# Patient Record
Sex: MALE | Race: WHITE
[De-identification: names, ages, dates, MRNs, and addresses within clinical notes are randomized per-mention and may not be internally consistent; named-entity substitution may affect disease eponyms.]

---

## 2019-01-31 ENCOUNTER — HOSPITAL ENCOUNTER (INPATIENT)
Dept: HOSPITAL 74 - YASAS | Age: 34
LOS: 4 days | Discharge: HOME | DRG: 773 | End: 2019-02-04
Attending: NEUROMUSCULOSKELETAL MEDICINE & OMM | Admitting: NEUROMUSCULOSKELETAL MEDICINE & OMM
Payer: COMMERCIAL

## 2019-01-31 VITALS — BODY MASS INDEX: 31.7 KG/M2

## 2019-01-31 DIAGNOSIS — F10.230: ICD-10-CM

## 2019-01-31 DIAGNOSIS — F19.24: ICD-10-CM

## 2019-01-31 DIAGNOSIS — F17.213: ICD-10-CM

## 2019-01-31 DIAGNOSIS — F32.9: ICD-10-CM

## 2019-01-31 DIAGNOSIS — F11.23: Primary | ICD-10-CM

## 2019-01-31 PROCEDURE — HZ2ZZZZ DETOXIFICATION SERVICES FOR SUBSTANCE ABUSE TREATMENT: ICD-10-PCS | Performed by: NEUROMUSCULOSKELETAL MEDICINE & OMM

## 2019-01-31 RX ADMIN — Medication SCH MG: at 21:53

## 2019-01-31 NOTE — HP
COWS





- Scale


Resting Pulse: 1= [place] 


Sweatin= Chills/Flushing


Restless Observation: 0= Sits Still


Pupil Size: 0= Normal to Room Light


Bone or Joint Aches: 4=Acute Joint/Muscle Pain


Runny Nose/ Eye Tearin= None


GI Upset > 30mins: 2= Nausea/Diarrhea


Tremor Observation: 0= None


Yawning Observation: 0= None


Anxiety or Irritability: 1=Feels Anxious/Irritable


Goose Flesh Skin: 0=Smooth Skin


COWS Score: 9





CIWA Score


Nausea/Vomitin-Mild Nausea/No Vomiting


Muscle Tremors: None


Anxiety: 1-Mildly Anxious


Agitation: 0-Normal Activity


Paroxysmal Sweats: 3


Orientation: 0-Oriented


Tacttile Disturbances: 2-Mild Itch/Numbness/Burn


Auditory Disturbances: 0-None


Visual Disturbances: 2-Mild Sensitivity


Headache: 3-Moderate


CIWA-Ar Total Score: 12





- Admission Criteria


[facility] Guidelines: Admission for Medically Managed Detox: 


Requires at least one of the followin. CIWA greater than 12


2. Seizures within the past 24 hours


3. Delirium tremens within the past 24 hours


4. Hallucinations within the past 24 hours


5. Acute intervention needed for co  occurring medical disorder


6. Acute intervention needed for co  occurring psychiatric disorder


7. Severe withdrawal that cannot be handled at a lower level of care (continued


    vomiting, continued diarrhea, abnormal vital signs) requiring intravenous


    medication and/or fluids


8. Pregnancy





Patient presents the following: CIWA greater than 12


Admission Criteria Met: Admission criteria met





Admission ROS [facility]


Chief Complaint: 





C/ WORSENING WITHDRAWAL SX'S


SEEKING DETOX 


Allergies/Adverse Reactions: 


 Allergies











Allergy/AdvReac Type Severity Reaction Status Date / Time


 


No Known Allergies Allergy   Verified 19 18:18











History of Present Illness: 





33 Y.. MALE WITH HX/O POLYSUBSTANCE ABUSE HERE FOR HEROIN/ALCOHOL DETOX. THIS 

IS CLIENTS FIRST ADMISSION HERE. REFERRED BY CHELI NOLEN AFTER HE PRESENTED 

THERE FOR DETOX AND THERE WERE NO AVAILABLE BEDS. PRESENTS TODAY WITH WORSENING 

WITHDRAWAL SX'S COWS 9/ CIWA 12. REPORTS LONGEST CLEAN TIME 1 YEAR. RELAPSING 1 

MONTH AGO. PMHX- DENIES MEDICAL BU REPORTS HX/O DEPRESSION. DENIES SI/HI, AVH. 

CURRENTLY LIVES WITH FAMILY, EMPLOYED, DENIES LEGALS.


Exam Limitations: No Limitations





- Ebola screening


Have you traveled outside of the country in the last 21 days: No


Have you had contact with anyone from an Ebola affected area: No


Have you been sick,other than usual withdrawal symptoms: No


Do you have a fever: No





- Review of Systems


Constitutional: Chills, Loss of Appetite, Malaise, Night Sweats, Changes in 

sleep


EENT: reports: No Symptoms Reported


Respiratory: reports: No Symptoms reported


Cardiac: reports: No Symptoms Reported


GI: reports: Diarrhea, Nausea, Poor Appetite


: reports: No Symptoms Reported


Musculoskeletal: reports: No Symptoms Reported


Integumentary: reports: No Symptoms Reported


Neuro: reports: Headache, Weakness (GENERALIZED)


Endocrine: reports: No Symptoms Reported


Hematology: reports: No Symptoms Reported


Psychiatric: reports: Orientated x3, Depressed


Other Systems: Reviewed and Negative





Patient History





- Patient Medical History


Hx Anemia: No


Hx Asthma: No


Hx Chronic Obstructive Pulmonary Disease (COPD): No


Hx Cancer: No


Hx Cardiac Disorders: No


Hx Congestive Heart Failure: No


Hx Hypertension: No


Hx Hypercholesterolemia: No


Hx Pacemaker: No


HX Cerebrovascular Accident: No


Hx Seizures: No


Hx Diabetes: No


Hx Gastrointestinal Disorders: No


Hx Liver Disease: No


Hx Genitourinary Disorders: No


Hx Sexually Transmitted Disorders: No


Hx Renal Disease (ESRD): No


Hx Thyroid Disease: No


Hx Human Immunodeficiency Virus (HIV): No


Hx Hepatitis C: No


Hx Depression: Yes


Hx Suicide Attempt: No


Hx Bipolar Disorder: No


Hx Schizophrenia: No





- Patient Surgical History


Past Surgical History: No


Hx Neurologic Surgery: No


Hx Cataract Extraction: No


Hx Cardiac Surgery: No


Hx Lung Surgery: No


Hx Breast Surgery: No


Hx Breast Biopsy: No


Hx Abdominal Surgery: No


Hx Appendectomy: No


Hx Cholecystectomy: No


Hx Genitourinary Surgery: No


Hx  Section: No


Hx Orthopedic Surgery: No


Anesthesia Reaction: No





- PPD History


Previous Implant?: Yes


Documented Results: Negative w/o proof


Implanted On Prior [facility] Admission?: No


PPD to be Administered?: Yes





- Smoking Cessation


Smoking history: Current every day smoker


Have you smoked in the past 12 months: Yes


Aproximately how many cigarettes per day: 5


Cigars Per Day: 0


Hx Chewing Tobacco Use: No


Initiated information on smoking cessation: Yes


'Breaking Loose' booklet given: 19





- Substance & Tx. History


Hx Alcohol Use: Yes


Hx Substance Use: Yes


Substance Use Type: Alcohol, Heroin, Marijuana


Hx Substance Use Treatment: Yes ([facility])





- Substances Abused


  ** Alcohol


Route: Oral


Frequency: Daily


Amount used: LIQUOR- 1 PINT. BEER- 5 (40oz)


Age of first use: 18


Date of Last Use: 19





  ** Heroin


Route: Inhalation


Frequency: Daily


Amount used: 20bags


Age of first use: 30


Date of Last Use: 19





Family Disease History





- Family Disease History


Family History: Denies





Admission Physical Exam [facility]





- Vital Signs


Vital Signs: 


 Vital Signs - 24 hr











  19





  16:49


 


Temperature 97 F L


 


Pulse Rate 89


 


Respiratory 18





Rate 


 


Blood Pressure 105/77














- Physical


General Appearance: Yes: Appropriately Dressed, Tremorous (SLIGHT)


HEENTM: Yes: EOMI, Normal ENT Inspection, Normocephalic, Normal Voice, SPENCER, 

Pharynx Normal


Respiratory: Yes: Chest Non-Tender, Lungs Clear, Normal Breath Sounds, No 

Respiratory Distress, No Accessory Muscle Use


Neck: Yes: No masses,lesions,Nodules, Supple, Trachea in good position


Breast: Yes: Breast Exam Deferred


Cardiology: Yes: Regular Rhythm, Regular Rate, S1, S2


Abdominal: Yes: Normal Bowel Sounds, Non Tender


Genitourinary: Yes: Other (NO C/O)


Back: Yes: Normal Inspection


Musculoskeletal: Yes: full range of Motion, Gait Steady


Extremities: Yes: Normal Capillary Refill, Normal Range of Motion, Non-Tender, 

Tremors (SLIGHT)


Neurological: Yes: CNs II-XII NML intact, Fully Oriented, Alert, Motor Strength 

5/5, Depressed Affect


Integumentary: Yes: Dry, Warm


Lymphatic: Yes: Within Normal Limits





- Diagnostic


(1) Polysubstance abuse


Current Visit: Yes   Status: Acute   





(2) Alcohol dependence with uncomplicated withdrawal


Current Visit: Yes   Status: Acute   





(3) Opioid dependence with withdrawal


Current Visit: Yes   Status: Acute   





(4) Nicotine dependence


Current Visit: Yes   Status: Chronic   


Qualifiers: 


   Nicotine product type: cigarettes   Substance use status: uncomplicated   

Qualified Code(s): F17.210 - Nicotine dependence, cigarettes, uncomplicated   





(5) Depressed affect


Current Visit: Yes   Status: Acute   





(6) Substance induced mood disorder


Current Visit: Yes   Status: Suspected   





(7) Cannabis dependence, uncomplicated


Current Visit: Yes   Status: Acute   





Cleared for Admission [facility]





- Detox or Rehab


[facility] Level of Care: Medically Managed


Detox Regimen/Protocol: Methadone/Librium


Claeared for Rehab Admission: No





[facility] Breath Alcohol Content


Breath Alcohol Content: 0





Urine Drug Screen





- Results


Drug Screen Negative: No


Urine Drug Screen Results: THC-Marijuana, OPI-Opiates, BAR-Barbiturates, MTD-

Methadone, FEN-Fentanyl

## 2019-02-01 LAB
ALBUMIN SERPL-MCNC: 4.2 G/DL (ref 3.4–5)
ALP SERPL-CCNC: 98 U/L (ref 45–117)
ALT SERPL-CCNC: 52 U/L (ref 13–61)
ANION GAP SERPL CALC-SCNC: 8 MMOL/L (ref 8–16)
APPEARANCE UR: (no result)
AST SERPL-CCNC: 26 U/L (ref 15–37)
BILIRUB SERPL-MCNC: 0.5 MG/DL (ref 0.2–1)
BILIRUB UR STRIP.AUTO-MCNC: NEGATIVE MG/DL
BUN SERPL-MCNC: 17 MG/DL (ref 7–18)
CALCIUM SERPL-MCNC: 9.7 MG/DL (ref 8.5–10.1)
CHLORIDE SERPL-SCNC: 104 MMOL/L (ref 98–107)
CO2 SERPL-SCNC: 28 MMOL/L (ref 21–32)
COLOR UR: (no result)
CREAT SERPL-MCNC: 0.9 MG/DL (ref 0.55–1.3)
DEPRECATED RDW RBC AUTO: 14 % (ref 11.9–15.9)
EPITH CASTS URNS QL MICRO: (no result) /HPF
GLUCOSE SERPL-MCNC: 99 MG/DL (ref 74–106)
HCT VFR BLD CALC: 49.5 % (ref 35.4–49)
HGB BLD-MCNC: 16.6 GM/DL (ref 11.7–16.9)
KETONES UR QL STRIP: NEGATIVE
LEUKOCYTE ESTERASE UR QL STRIP.AUTO: NEGATIVE
MCH RBC QN AUTO: 30.9 PG (ref 25.7–33.7)
MCHC RBC AUTO-ENTMCNC: 33.6 G/DL (ref 32–35.9)
MCV RBC: 91.9 FL (ref 80–96)
MUCOUS THREADS URNS QL MICRO: (no result)
NITRITE UR QL STRIP: NEGATIVE
PH UR: 5 [PH] (ref 5–8)
PLATELET # BLD AUTO: 289 K/MM3 (ref 134–434)
PMV BLD: 9.6 FL (ref 7.5–11.1)
POTASSIUM SERPLBLD-SCNC: 4.6 MMOL/L (ref 3.5–5.1)
PROT SERPL-MCNC: 7.3 G/DL (ref 6.4–8.2)
PROT UR QL STRIP: (no result)
PROT UR QL STRIP: NEGATIVE
RBC # BLD AUTO: 5.38 M/MM3 (ref 4–5.6)
SODIUM SERPL-SCNC: 139 MMOL/L (ref 136–145)
SP GR UR: 1.03 (ref 1.01–1.03)
UROBILINOGEN UR STRIP-MCNC: NEGATIVE MG/DL (ref 0.2–1)
WBC # BLD AUTO: 8.8 K/MM3 (ref 4–10)

## 2019-02-01 RX ADMIN — Medication SCH MG: at 22:51

## 2019-02-01 RX ADMIN — Medication SCH TAB: at 10:26

## 2019-02-01 RX ADMIN — NICOTINE SCH: 14 PATCH, EXTENDED RELEASE TRANSDERMAL at 10:28

## 2019-02-01 NOTE — EKG
Test Reason : 

Blood Pressure : ***/*** mmHG

Vent. Rate : 082 BPM     Atrial Rate : 082 BPM

   P-R Int : 130 ms          QRS Dur : 076 ms

    QT Int : 360 ms       P-R-T Axes : -18 020 031 degrees

   QTc Int : 420 ms

 

NORMAL SINUS RHYTHM

NORMAL ECG

NO PREVIOUS ECGS AVAILABLE

Confirmed by VIK INGRAM, KEREN ([id number]) on 2/1/2019 11:49:25 AM

 

Referred By:             Confirmed By:KEREN CHERY MD

## 2019-02-01 NOTE — PN
S CIWA





- CIWA Score


Nausea/Vomitin-No Nausea/No Vomiting


Muscle Tremors: 3


Anxiety: 2


Agitation: 0-Normal Activity


Paroxysmal Sweats: 3


Orientation: 4Disoriented Place/Person


Tacttile Disturbances: 2-Mild Itch/Numbness/Burn


Auditory Disturbances: 0-None


Visual Disturbances: 2-Mild Sensitivity


Headache: 0-None Present


CIWA-Ar Total Score: 16





[facility] COWS





- Scale


Resting Pulse: 0= [place] 80 or Below


Sweatin= Chills/Flushing


Restless Observation: 1= Difficult to Sit Still


Pupil Size: 0= Normal to Room Light


Bone or Joint Aches: 2= Severe Diffuse Aches


Runny Nose/ Eye Tearin= None


GI Upset > 30mins: 0= None


Tremor Observation of Outstretched Hands: 2= Slight Tremor Visible


Yawning Observation: 1= 1-2x During Session


Anxiety or Irritability: 2=Irritable/Anxious


Goose Flesh Skin: 3=[place]


COWS Score: 12





BHS Progress Note (SOAP)


Subjective: 





Body Aches, Tremors, Sweating.


Objective: 


PATIENT A & O X 2 (UNCERTAIN ABOUT CURRENT LOCATION). PATIENT OBSERVED 

AMBULATING ON UNIT. IN NO ACUTE DISTRESS.





19 17:57


 Vital Signs











Temperature  98 F   19 17:10


 


Pulse Rate  80   19 17:10


 


Respiratory Rate  16   19 17:10


 


Blood Pressure  102/63   19 17:10


 


O2 Sat by Pulse Oximetry (%)      








 Laboratory Tests











  19





  00:30 07:00 07:00


 


WBC   8.8 


 


RBC   5.38 


 


Hgb   16.6 


 


Hct   49.5 H 


 


MCV   91.9 


 


MCH   30.9 


 


MCHC   33.6 


 


RDW   14.0 


 


Plt Count   289 


 


MPV   9.6 


 


Sodium    139


 


Potassium    4.6


 


Chloride    104


 


Carbon Dioxide    28


 


Anion Gap    8


 


BUN    17


 


Creatinine    0.9


 


Creat Clearance w eGFR    > 60


 


Random Glucose    99


 


Calcium    9.7


 


Total Bilirubin    0.5


 


AST    26


 


ALT    52


 


Alkaline Phosphatase    98


 


Total Protein    7.3


 


Albumin    4.2


 


Urine Color  Dkyellow  


 


Urine Appearance  Slcloudy  


 


Urine pH  5.0  


 


Ur Specific Gravity  1.031  


 


Urine Protein  1+ H  


 


Urine Glucose (UA)  Negative  


 


Urine Ketones  Negative  


 


Urine Blood  Negative  


 


Urine Nitrite  Negative  


 


Urine Bilirubin  Negative  


 


Urine Urobilinogen  Negative  


 


Ur Leukocyte Esterase  Negative  


 


Urine WBC (Auto)  2  


 


Urine RBC (Auto)  1  


 


Ur Epithelial Cells  Rare  


 


Urine Mucus  Many  








LABS NOTED.


RPR RESULT PENDING.


19 17:59





Assessment: 





19 17:58


WITHDRAWAL SYMPTOMS.


Plan: 





CONTINUE DETOX.


INCREASE DAILY PO FLUID INTAKE.

## 2019-02-01 NOTE — CONSULT
[facility] Psychiatric Consult





- Data


Date of interview: 02/01/19


Admission source: [facility]


Identifying data: Mr green is a 33 years old single  male, employed 

as a ayala in a school, living with family seeking detox treatment for alcohol 

and opioid


Substance Abuse History: Reports history of alcohol and heroin use. Refer to 

addiction counselor's summary for further information


Medical History: Unremarkable. Smokes 5 cigarettes daily


Psychiatric History: Denies history of previous psychiatric treatment


Physical/Sexual Abuse/Trauma History: Denies history of emotional, physical or 

sexual abuse as well as DV relationship. No  service


Additional Comment: Reports history of multiple previous arrests and one felony 

conviction. Denies being on parole/probation at present





Mental Status Exam





- Mental Status Exam


Alert and Oriented to: Time, Place, Person


Cognitive Function: Fair


Patient Appearance: Well Groomed


Mood: Depressed


Affect: Appropriate


Patient Behavior: Cooperative


Speech Pattern: Clear


Voice Loudness: Normal


Thought Process: Intact, Goal Oriented


Thought Disorder: Not Present


Hallucinations: Denies


Suicidal Ideation: Denies


Homicidal Ideation: Denies


Insight/Judgement: Fair


Sleep: Fair


Appetite: Good


Muscle strength/Tone: Normal


Gait/Station: Normal





Psychiatric Findings





- Problem List (Axis 1, 2,3)


(1) Substance induced mood disorder


Current Visit: Yes   Status: Acute   





(2) Alcohol dependence with uncomplicated withdrawal


Current Visit: Yes   Status: Acute   





(3) Opioid dependence with withdrawal


Current Visit: Yes   Status: Acute   





(4) Nicotine dependence


Current Visit: Yes   Status: Chronic   


Qualifiers: 


   Nicotine product type: cigarettes   Substance use status: uncomplicated   

Qualified Code(s): F17.210 - Nicotine dependence, cigarettes, uncomplicated   





- Initial Treatment Plan


Initial Treatment Plan: Continue inpatient detoxification

## 2019-02-02 RX ADMIN — Medication PRN MG: at 00:29

## 2019-02-02 RX ADMIN — Medication PRN MG: at 22:12

## 2019-02-02 RX ADMIN — NICOTINE SCH: 14 PATCH, EXTENDED RELEASE TRANSDERMAL at 10:30

## 2019-02-02 RX ADMIN — Medication SCH MG: at 22:11

## 2019-02-02 RX ADMIN — Medication SCH TAB: at 10:32

## 2019-02-02 NOTE — PN
S CIWA





- CIWA Score


Nausea/Vomitin-No Nausea/No Vomiting


Muscle Tremors: None


Anxiety: 3


Agitation: 2


Paroxysmal Sweats: 3


Orientation: 0-Oriented


Tacttile Disturbances: 2-Mild Itch/Numbness/Burn


Auditory Disturbances: 0-None


Visual Disturbances: 0-None


Headache: 0-None Present


CIWA-Ar Total Score: 10





BHS COWS





- Scale


Resting Pulse: 0= [place] 80 or Below


Sweatin= Chills/Flushing


Restless Observation: 1= Difficult to Sit Still


Pupil Size: 0= Normal to Room Light


Bone or Joint Aches: 2= Severe Diffuse Aches


Runny Nose/ Eye Tearin= Nasal Congestion


GI Upset > 30mins: 0= None


Tremor Observation of Outstretched Hands: 0= None


Yawning Observation: 1= 1-2x During Session


Anxiety or Irritability: 2=Irritable/Anxious


Goose Flesh Skin: 0=Smooth Skin


COWS Score: 8





BHS Progress Note (SOAP)


Subjective: 





Body Aches, Sweating.


Objective: 


PATIENT A & O X 3, OBSERVED AMBULATING ON UNIT. IN NO ACUTE DISTRESS.





19 15:27


 Vital Signs











Temperature  96.1 F L  19 13:50


 


Pulse Rate  80   19 13:50


 


Respiratory Rate  20   19 13:50


 


Blood Pressure  115/79   19 13:50


 


O2 Sat by Pulse Oximetry (%)      








 Laboratory Tests











  19





  00:30 07:00 07:00


 


WBC   8.8 


 


RBC   5.38 


 


Hgb   16.6 


 


Hct   49.5 H 


 


MCV   91.9 


 


MCH   30.9 


 


MCHC   33.6 


 


RDW   14.0 


 


Plt Count   289 


 


MPV   9.6 


 


Sodium    139


 


Potassium    4.6


 


Chloride    104


 


Carbon Dioxide    28


 


Anion Gap    8


 


BUN    17


 


Creatinine    0.9


 


Creat Clearance w eGFR    > 60


 


Random Glucose    99


 


Calcium    9.7


 


Total Bilirubin    0.5


 


AST    26


 


ALT    52


 


Alkaline Phosphatase    98


 


Total Protein    7.3


 


Albumin    4.2


 


Urine Color  Dkyellow  


 


Urine Appearance  Slcloudy  


 


Urine pH  5.0  


 


Ur Specific Gravity  1.031  


 


Urine Protein  1+ H  


 


Urine Glucose (UA)  Negative  


 


Urine Ketones  Negative  


 


Urine Blood  Negative  


 


Urine Nitrite  Negative  


 


Urine Bilirubin  Negative  


 


Urine Urobilinogen  Negative  


 


Ur Leukocyte Esterase  Negative  


 


Urine WBC (Auto)  2  


 


Urine RBC (Auto)  1  


 


Ur Epithelial Cells  Rare  


 


Urine Mucus  Many  


 


RPR Titer   














  19





  07:00


 


WBC 


 


RBC 


 


Hgb 


 


Hct 


 


MCV 


 


MCH 


 


MCHC 


 


RDW 


 


Plt Count 


 


MPV 


 


Sodium 


 


Potassium 


 


Chloride 


 


Carbon Dioxide 


 


Anion Gap 


 


BUN 


 


Creatinine 


 


Creat Clearance w eGFR 


 


Random Glucose 


 


Calcium 


 


Total Bilirubin 


 


AST 


 


ALT 


 


Alkaline Phosphatase 


 


Total Protein 


 


Albumin 


 


Urine Color 


 


Urine Appearance 


 


Urine pH 


 


Ur Specific Gravity 


 


Urine Protein 


 


Urine Glucose (UA) 


 


Urine Ketones 


 


Urine Blood 


 


Urine Nitrite 


 


Urine Bilirubin 


 


Urine Urobilinogen 


 


Ur Leukocyte Esterase 


 


Urine WBC (Auto) 


 


Urine RBC (Auto) 


 


Ur Epithelial Cells 


 


Urine Mucus 


 


RPR Titer  Nonreactive








LABS NOTED.


Assessment: 





19 15:27


WITHDRAWAL SYMPTOMS.


Plan: 





CONTINUE DETOX.


INCREASE DAILY PO FLUID INTAKE.


PATIENT REPORTS THAT HE IS TOLERATING CURRENT WITHDRAWAL / DETOX SYMPTOMS WELL. 

AT PATIENT'S REQUEST, CURRENT DETOX MEDICATION REGIMEN MODIFIED SO THAT PATIENT 

MAY BE DISCHARGED ON MONDAY, 2019.

## 2019-02-03 RX ADMIN — NICOTINE SCH: 14 PATCH, EXTENDED RELEASE TRANSDERMAL at 10:18

## 2019-02-03 RX ADMIN — Medication SCH TAB: at 10:18

## 2019-02-03 RX ADMIN — Medication SCH MG: at 22:03

## 2019-02-03 RX ADMIN — Medication PRN MG: at 22:03

## 2019-02-03 NOTE — PN
[facility]HS Progress Note (SOAP)


Subjective: 





feeling better mild sweating less tremor social with peers in day room


discuss aftercare with staff


Objective: 





02/03/19 12:59


 Vital Signs











Temperature  97.4 F L  02/03/19 09:32


 


Pulse Rate  67   02/03/19 09:32


 


Respiratory Rate  17   02/03/19 09:32


 


Blood Pressure  111/83   02/03/19 09:32


 


O2 Sat by Pulse Oximetry (%)      








 Laboratory Last Values











WBC  8.8 K/mm3 (4.0-10.0)   02/01/19  07:00    


 


RBC  5.38 M/mm3 (4.00-5.60)   02/01/19  07:00    


 


Hgb  16.6 GM/dL (11.7-16.9)   02/01/19  07:00    


 


Hct  49.5 % (35.4-49)  H  02/01/19  07:00    


 


MCV  91.9 fl (80-96)   02/01/19  07:00    


 


MCH  30.9 pg (25.7-33.7)   02/01/19  07:00    


 


MCHC  33.6 g/dl (32.0-35.9)   02/01/19  07:00    


 


RDW  14.0 % (11.9-15.9)   02/01/19  07:00    


 


Plt Count  289 K/MM3 (134-434)   02/01/19  07:00    


 


MPV  9.6 fl (7.5-11.1)   02/01/19  07:00    


 


Sodium  139 mmol/L (136-145)   02/01/19  07:00    


 


Potassium  4.6 mmol/L (3.5-5.1)   02/01/19  07:00    


 


Chloride  104 mmol/L ()   02/01/19  07:00    


 


Carbon Dioxide  28 mmol/L (21-32)   02/01/19  07:00    


 


Anion Gap  8 MMOL/L (8-16)   02/01/19  07:00    


 


BUN  17 mg/dL (7-18)   02/01/19  07:00    


 


Creatinine  0.9 mg/dL (0.55-1.3)   02/01/19  07:00    


 


Creat Clearance w eGFR  > 60  (>60)   02/01/19  07:00    


 


Random Glucose  99 mg/dL ()   02/01/19  07:00    


 


Calcium  9.7 mg/dL (8.5-10.1)   02/01/19  07:00    


 


Total Bilirubin  0.5 mg/dL (0.2-1)   02/01/19  07:00    


 


AST  26 U/L (15-37)   02/01/19  07:00    


 


ALT  52 U/L (13-61)   02/01/19  07:00    


 


Alkaline Phosphatase  98 U/L ()   02/01/19  07:00    


 


Total Protein  7.3 g/dl (6.4-8.2)   02/01/19  07:00    


 


Albumin  4.2 g/dl (3.4-5.0)   02/01/19  07:00    


 


Urine Color  Dkyellow   02/01/19  00:30    


 


Urine Appearance  Slcloudy   02/01/19  00:30    


 


Urine pH  5.0  (5.0-8.0)   02/01/19  00:30    


 


Ur Specific Gravity  1.031  (1.010-1.035)   02/01/19  00:30    


 


Urine Protein  1+  (NEGATIVE)  H  02/01/19  00:30    


 


Urine Glucose (UA)  Negative  (NEGATIVE)   02/01/19  00:30    


 


Urine Ketones  Negative  (NEGATIVE)   02/01/19  00:30    


 


Urine Blood  Negative  (NEGATIVE)   02/01/19  00:30    


 


Urine Nitrite  Negative  (NEGATIVE)   02/01/19  00:30    


 


Urine Bilirubin  Negative  (<2.0 mg/dL)   02/01/19  00:30    


 


Urine Urobilinogen  Negative mg/dL (0.2-1.0)   02/01/19  00:30    


 


Ur Leukocyte Esterase  Negative  (NEGATIVE)   02/01/19  00:30    


 


Urine WBC (Auto)  2 /hpf (3-5)   02/01/19  00:30    


 


Urine RBC (Auto)  1 /hpf (0-3)   02/01/19  00:30    


 


Ur Epithelial Cells  Rare /HPF (FEW)   02/01/19  00:30    


 


Urine Mucus  Many   02/01/19  00:30    


 


RPR Titer  Nonreactive  (NONREACTIVE)   02/01/19  07:00    








lab noted


Assessment: 





02/03/19 12:59


mild withdrawal sx


02/03/19 12:59





Plan: 





continue detox

## 2019-02-04 VITALS — SYSTOLIC BLOOD PRESSURE: 114 MMHG | HEART RATE: 71 BPM | TEMPERATURE: 96.4 F | DIASTOLIC BLOOD PRESSURE: 84 MMHG

## 2019-02-04 RX ADMIN — Medication SCH: at 09:52

## 2019-02-04 RX ADMIN — NICOTINE SCH: 14 PATCH, EXTENDED RELEASE TRANSDERMAL at 09:52

## 2019-02-04 NOTE — DS
[facility] Detox Discharge Summary


Admission Date: 


01/31/19





Discharge Date: 02/04/19





- History


Present History: Alcohol Dependence


Additional Comments: 





33 years old male admitted on 01/31/19 for alcohol withdrawal stabilization


completed detox regimen aftercare [facility]


Pertinent Past History: 





bring in a list of medication


bring in lab results


update medication list when changes in medication list


take medications as prescribed 





- Physical Exam Results


Vital Signs: 


 Vital Signs











Temperature  96.4 F L  02/04/19 09:08


 


Pulse Rate  71   02/04/19 09:08


 


Respiratory Rate  18   02/04/19 09:08


 


Blood Pressure  114/84   02/04/19 09:08


 


O2 Sat by Pulse Oximetry (%)      











Pertinent Admission Physical Exam Findings: 





alcohol withdrawal sx


 Laboratory Last Values











WBC  8.8 K/mm3 (4.0-10.0)   02/01/19  07:00    


 


RBC  5.38 M/mm3 (4.00-5.60)   02/01/19  07:00    


 


Hgb  16.6 GM/dL (11.7-16.9)   02/01/19  07:00    


 


Hct  49.5 % (35.4-49)  H  02/01/19  07:00    


 


MCV  91.9 fl (80-96)   02/01/19  07:00    


 


MCH  30.9 pg (25.7-33.7)   02/01/19  07:00    


 


MCHC  33.6 g/dl (32.0-35.9)   02/01/19  07:00    


 


RDW  14.0 % (11.9-15.9)   02/01/19  07:00    


 


Plt Count  289 K/MM3 (134-434)   02/01/19  07:00    


 


MPV  9.6 fl (7.5-11.1)   02/01/19  07:00    


 


Sodium  139 mmol/L (136-145)   02/01/19  07:00    


 


Potassium  4.6 mmol/L (3.5-5.1)   02/01/19  07:00    


 


Chloride  104 mmol/L ()   02/01/19  07:00    


 


Carbon Dioxide  28 mmol/L (21-32)   02/01/19  07:00    


 


Anion Gap  8 MMOL/L (8-16)   02/01/19  07:00    


 


BUN  17 mg/dL (7-18)   02/01/19  07:00    


 


Creatinine  0.9 mg/dL (0.55-1.3)   02/01/19  07:00    


 


Creat Clearance w eGFR  > 60  (>60)   02/01/19  07:00    


 


Random Glucose  99 mg/dL ()   02/01/19  07:00    


 


Calcium  9.7 mg/dL (8.5-10.1)   02/01/19  07:00    


 


Total Bilirubin  0.5 mg/dL (0.2-1)   02/01/19  07:00    


 


AST  26 U/L (15-37)   02/01/19  07:00    


 


ALT  52 U/L (13-61)   02/01/19  07:00    


 


Alkaline Phosphatase  98 U/L ()   02/01/19  07:00    


 


Total Protein  7.3 g/dl (6.4-8.2)   02/01/19  07:00    


 


Albumin  4.2 g/dl (3.4-5.0)   02/01/19  07:00    


 


Urine Color  Dkyellow   02/01/19  00:30    


 


Urine Appearance  Slcloudy   02/01/19  00:30    


 


Urine pH  5.0  (5.0-8.0)   02/01/19  00:30    


 


Ur Specific Gravity  1.031  (1.010-1.035)   02/01/19  00:30    


 


Urine Protein  1+  (NEGATIVE)  H  02/01/19  00:30    


 


Urine Glucose (UA)  Negative  (NEGATIVE)   02/01/19  00:30    


 


Urine Ketones  Negative  (NEGATIVE)   02/01/19  00:30    


 


Urine Blood  Negative  (NEGATIVE)   02/01/19  00:30    


 


Urine Nitrite  Negative  (NEGATIVE)   02/01/19  00:30    


 


Urine Bilirubin  Negative  (<2.0 mg/dL)   02/01/19  00:30    


 


Urine Urobilinogen  Negative mg/dL (0.2-1.0)   02/01/19  00:30    


 


Ur Leukocyte Esterase  Negative  (NEGATIVE)   02/01/19  00:30    


 


Urine WBC (Auto)  2 /hpf (3-5)   02/01/19  00:30    


 


Urine RBC (Auto)  1 /hpf (0-3)   02/01/19  00:30    


 


Ur Epithelial Cells  Rare /HPF (FEW)   02/01/19  00:30    


 


Urine Mucus  Many   02/01/19  00:30    


 


RPR Titer  Nonreactive  (NONREACTIVE)   02/01/19  07:00    








lab noted





- Treatment


Hospital Course: Detox Protocol Followed, Detoxed Safely, Responded well, 

Discharged Condition Good, Rehab Referral Accepted


Patient has Accepted a Rehab Referral to: [facility]





- Medication


Discharge Medications: 


Ambulatory Orders





NK [No Known Home Medication]  01/31/19 











- Diagnosis


(1) Alcohol dependence with uncomplicated withdrawal


Current Visit: Yes   Status: Acute   





(2) Substance induced mood disorder


Current Visit: Yes   Status: Suspected   





(3) Nicotine dependence


Current Visit: Yes   Status: Acute   


Qualifiers: 


   Nicotine product type: cigarettes   Substance use status: in withdrawal   

Qualified Code(s): F17.213 - Nicotine dependence, cigarettes, with withdrawal   





- AMA


Did Patient Leave Against Medical Advice: No

## 2019-03-29 ENCOUNTER — HOSPITAL ENCOUNTER (INPATIENT)
Dept: HOSPITAL 74 - YASAS | Age: 34
LOS: 3 days | Discharge: LEFT BEFORE DISCHARGE | DRG: 770 | End: 2019-04-01
Attending: SURGERY | Admitting: SURGERY
Payer: COMMERCIAL

## 2019-03-29 VITALS — BODY MASS INDEX: 32 KG/M2

## 2019-03-29 DIAGNOSIS — F11.23: Primary | ICD-10-CM

## 2019-03-29 DIAGNOSIS — F16.20: ICD-10-CM

## 2019-03-29 DIAGNOSIS — F10.230: ICD-10-CM

## 2019-03-29 DIAGNOSIS — F17.210: ICD-10-CM

## 2019-03-29 DIAGNOSIS — F32.9: ICD-10-CM

## 2019-03-29 DIAGNOSIS — F19.24: ICD-10-CM

## 2019-03-29 DIAGNOSIS — F12.20: ICD-10-CM

## 2019-03-29 PROCEDURE — HZ2ZZZZ DETOXIFICATION SERVICES FOR SUBSTANCE ABUSE TREATMENT: ICD-10-PCS | Performed by: NEUROMUSCULOSKELETAL MEDICINE & OMM

## 2019-03-29 RX ADMIN — Medication SCH MG: at 21:11

## 2019-03-29 RX ADMIN — Medication PRN MG: at 21:15

## 2019-03-29 NOTE — HP
COWS





- Scale


Resting Pulse: 0= [place] 80 or Below


Sweatin=Flushed/Facial Moisture


Restless Observation: 0= Sits Still


Pupil Size: 0= Normal to Room Light


Bone or Joint Aches: 4=Acute Joint/Muscle Pain


Runny Nose/ Eye Tearin= Runny Nose/Eyes


GI Upset > 30mins: 1= Stomach Cramp


Tremor Observation: 0= None


Yawning Observation: 0= None


Anxiety or Irritability: 1=Feels Anxious/Irritable


Goose Flesh Skin: 0=Smooth Skin


COWS Score: 10





CIWA Score


Nausea/Vomitin-Mild Nausea/No Vomiting


Muscle Tremors: None


Anxiety: 1-Mildly Anxious


Agitation: 0-Normal Activity


Paroxysmal Sweats: 3


Orientation: 0-Oriented


Tacttile Disturbances: 1-Very Mild Itch/Numbness


Auditory Disturbances: 1-Very Mild


Visual Disturbances: 2-Mild Sensitivity


Headache: 5-Severe


CIWA-Ar Total Score: 14





- Admission Criteria


[facility] Guidelines: Admission for Medically Managed Detox: 


Requires at least one of the followin. CIWA greater than 12


2. Seizures within the past 24 hours


3. Delirium tremens within the past 24 hours


4. Hallucinations within the past 24 hours


5. Acute intervention needed for co  occurring medical disorder


6. Acute intervention needed for co  occurring psychiatric disorder


7. Severe withdrawal that cannot be handled at a lower level of care (continued


    vomiting, continued diarrhea, abnormal vital signs) requiring intravenous


    medication and/or fluids


8. Pregnancy








Admission [facility]


Allergies/Adverse Reactions: 


 Allergies











Allergy/AdvReac Type Severity Reaction Status Date / Time


 


No Known Allergies Allergy   Verified 19 18:18











History of Present Illness: 








Search Terms: álvaro green, 1985


Search Date: 2019 05:37:48 PM





The Drug Utilization Report below displays all of the controlled substance 

prescriptions, if any, that your patient has filled in the last twelve months. 

The information displayed on this report is compiled from pharmacy submissions 

to the Department, and accurately reflects the information as submitted by the 

pharmacies.





This report was requested by: Muriel Portillo | Reference #: [id number]





There are no results for the search terms that you entered.


 pt here erquesting detox  form pcp , heroin , etoh use  . 


heroin : 20-30 bags/day via  inhalation , first age of  use [age] -   

for 1  year w/  sobriety while at [facility]  , latest use  yesterday  , 

current symptoms as above  , denies OD  . 


etoh :  daily 41 - oz  beer  and 1  pint  on weekends  , reports symptoms as 

above  , denies seizures,  + blackouts  , + tremors  , +  falls  while  

intoxicated , latest 1  year ago  no injuries  , first age  of use 18 , latest 

use yesterday .  


pcp : daily , 30 $  , latest use Wednesday 3/27/19


benzo - denies  


fent- denies 


bup -reports  illicit use  " from a friend " . 


PMHX : denies


PShx : denies 


PSych : denies  


Meds - denies 


Shx ; lives w/ mother  , employed   in school. legal - denies   


Exam Limitations: Clinical Condition





- Ebola screening


Have you traveled outside of the country in the last 21 days: No


Have you had contact with anyone from an Ebola affected area: No


Have you been sick,other than usual withdrawal symptoms: No





- Review of Systems


Constitutional: See HPI, Loss of Appetite


EENT: reports: Nose Congestion, Other (myopia  , denies dysphagia)


Respiratory: reports: No Symptoms reported


Cardiac: reports: No Symptoms Reported


GI: reports: See HPI


: reports: No Symptoms Reported


Musculoskeletal: reports: Muscle Pain


Integumentary: reports: No Symptoms Reported


Neuro: reports: No Symptoms reported


Endocrine: reports: No Symptoms Reported


Psychiatric: reports: Orientated x3, Anxious





Patient History





- Patient Medical History


Hx Anemia: No


Hx Asthma: No


Hx Chronic Obstructive Pulmonary Disease (COPD): No


Hx Cancer: No


Hx Cardiac Disorders: No


Hx Congestive Heart Failure: No


Hx Hypertension: No


Hx Hypercholesterolemia: No


Hx Pacemaker: No


HX Cerebrovascular Accident: No


Hx Seizures: No


Hx Diabetes: No


Hx Gastrointestinal Disorders: No


Hx Liver Disease: No


Hx Genitourinary Disorders: No


Hx Sexually Transmitted Disorders: No


Hx Renal Disease (ESRD): No


Hx Thyroid Disease: No


Hx Human Immunodeficiency Virus (HIV): No


Hx Hepatitis C: No


Hx Depression: Yes


Hx Suicide Attempt: No


Hx Bipolar Disorder: No


Hx Schizophrenia: No





- Patient Surgical History


Past Surgical History: No


Hx Neurologic Surgery: No


Hx Cataract Extraction: No


Hx Cardiac Surgery: No


Hx Lung Surgery: No


Hx Breast Surgery: No


Hx Breast Biopsy: No


Hx Abdominal Surgery: No


Hx Appendectomy: No


Hx Cholecystectomy: No


Hx Genitourinary Surgery: No


Hx  Section: No


Hx Orthopedic Surgery: No


Anesthesia Reaction: No





- PPD History


Date: 19





- Smoking Cessation


Smoking history: Current every day smoker


Have you smoked in the past 12 months: Yes


Aproximately how many cigarettes per day: 5


Cigars Per Day: 0


Hx Chewing Tobacco Use: No


Initiated information on smoking cessation: No





Family Disease History





- Family Disease History


Family Disease History: Diabetes: Grandparent (gm , cousin , aunt ), [place]: 

Grandparent, Other: Father (d. 20's  trauma ), Mother (A & W ), Brother (2 , a 

& W  no CATHY )


Other Family History: no children





Admission Physical Exam [facility]





- Vital Signs


Vital Signs: 


 Vital Signs - 24 hr











  19





  16:55


 


Temperature 96.8 F L


 


Pulse Rate 80


 


Respiratory 18





Rate 


 


Blood Pressure 125/83














- Physical


General Appearance: Yes: Mild Distress


HEENTM: Yes: EOMI, Hearing grossly Normal, Normocephalic, Normal Voice, 

Rhinorrhea


Respiratory: Yes: Chest Non-Tender, Lungs Clear, Normal Breath Sounds


Neck: Yes: No masses,lesions,Nodules, Trachea in good position


Cardiology: Yes: Regular Rhythm, Regular Rate, S1, S2


Abdominal: Yes: Non Tender, Soft


Back: Yes: Normal Inspection


Musculoskeletal: Yes: Gait Steady


Extremities: Yes: Normal Range of Motion, Non-Tender


Neurological: Yes: Motor Strength 5/5





- Diagnostic


(1) Alcohol dependence with uncomplicated withdrawal


Current Visit: Yes   Status: Acute   





(2) Nicotine dependence


Current Visit: Yes   Status: Chronic   


Qualifiers: 


   Nicotine product type: cigarettes 





(3) Opioid dependence with withdrawal


Current Visit: Yes   Status: Acute   





(4) Phencyclidine dependence


Current Visit: Yes   Status: Chronic   





[facility] Breath Alcohol Content


Breath Alcohol Content: 0





Urine Drug Screen





- Results


Drug Screen Negative: No


Urine Drug Screen Results: OPI-Opiates, BZO-Benzodiazepines, FEN-Fentanyl, BUP-

Suboxone





Inpatient Rehab Admission





- Rehab Decision to Admit


Inpatient rehab admission?: No

## 2019-03-30 LAB
ALBUMIN SERPL-MCNC: 3.6 G/DL (ref 3.4–5)
ALP SERPL-CCNC: 94 U/L (ref 45–117)
ALT SERPL-CCNC: 38 U/L (ref 13–61)
ANION GAP SERPL CALC-SCNC: 6 MMOL/L (ref 8–16)
AST SERPL-CCNC: 22 U/L (ref 15–37)
BILIRUB SERPL-MCNC: 0.4 MG/DL (ref 0.2–1)
BUN SERPL-MCNC: 17 MG/DL (ref 7–18)
CALCIUM SERPL-MCNC: 8.8 MG/DL (ref 8.5–10.1)
CHLORIDE SERPL-SCNC: 107 MMOL/L (ref 98–107)
CO2 SERPL-SCNC: 28 MMOL/L (ref 21–32)
CREAT SERPL-MCNC: 0.8 MG/DL (ref 0.55–1.3)
DEPRECATED RDW RBC AUTO: 14 % (ref 11.9–15.9)
GLUCOSE SERPL-MCNC: 92 MG/DL (ref 74–106)
HCT VFR BLD CALC: 47.9 % (ref 35.4–49)
HGB BLD-MCNC: 15.6 GM/DL (ref 11.7–16.9)
MCH RBC QN AUTO: 29.9 PG (ref 25.7–33.7)
MCHC RBC AUTO-ENTMCNC: 32.6 G/DL (ref 32–35.9)
MCV RBC: 91.8 FL (ref 80–96)
PLATELET # BLD AUTO: 217 K/MM3 (ref 134–434)
PMV BLD: 9.2 FL (ref 7.5–11.1)
POTASSIUM SERPLBLD-SCNC: 4.4 MMOL/L (ref 3.5–5.1)
PROT SERPL-MCNC: 6.6 G/DL (ref 6.4–8.2)
RBC # BLD AUTO: 5.21 M/MM3 (ref 4–5.6)
SODIUM SERPL-SCNC: 141 MMOL/L (ref 136–145)
WBC # BLD AUTO: 6.3 K/MM3 (ref 4–10)

## 2019-03-30 RX ADMIN — Medication SCH TAB: at 10:38

## 2019-03-30 RX ADMIN — Medication SCH MG: at 22:23

## 2019-03-30 RX ADMIN — Medication PRN MG: at 22:23

## 2019-03-30 RX ADMIN — METHOCARBAMOL PRN MG: 500 TABLET ORAL at 22:24

## 2019-03-30 NOTE — PN
S CIWA





- CIWA Score


Nausea/Vomitin


Muscle Tremors: 2


Anxiety: 2


Agitation: 2


Paroxysmal Sweats: 2


Orientation: 0-Oriented


Tacttile Disturbances: 2-Mild Itch/Numbness/Burn


Auditory Disturbances: 0-None


Visual Disturbances: 0-None


Headache: 2-Mild


CIWA-Ar Total Score: 14





[facility] COWS





- Scale


Resting Pulse: 0= [place] 80 or Below


Sweatin=Flushed/Facial Moisture


Restless Observation: 1= Difficult to Sit Still


Pupil Size: 0= Normal to Room Light


Bone or Joint Aches: 1= Mild Discomfort


Runny Nose/ Eye Tearin= Runny Nose/Eyes


GI Upset > 30mins: 2= Nausea/Diarrhea


Tremor Observation of Outstretched Hands: 2= Slight Tremor Visible


Yawning Observation: 0= None


Anxiety or Irritability: 2=Irritable/Anxious


Goose Flesh Skin: 0=Smooth Skin


COWS Score: 12





[facility] Progress Note (SOAP)


Subjective: 





Sweats, leg pain, shakes and interrupted sleep


Objective: 





19 15:08


 Vital Signs











  19





  08:36 09:49 14:35


 


Temperature 97.3 F L 97.2 F L 96.8 F L


 


Pulse Rate 66 71 79


 


Respiratory 18 18 18





Rate   


 


Blood Pressure 111/67 137/86 120/80








 Laboratory Last Values











WBC  6.3 K/mm3 (4.0-10.0)   19  07:45    


 


RBC  5.21 M/mm3 (4.00-5.60)   19  07:45    


 


Hgb  15.6 GM/dL (11.7-16.9)   19  07:45    


 


Hct  47.9 % (35.4-49)   19  07:45    


 


MCV  91.8 fl (80-96)   19  07:45    


 


MCH  29.9 pg (25.7-33.7)   19  07:45    


 


MCHC  32.6 g/dl (32.0-35.9)   19  07:45    


 


RDW  14.0 % (11.9-15.9)   19  07:45    


 


Plt Count  217 K/MM3 (134-434)  D 19  07:45    


 


MPV  9.2 fl (7.5-11.1)   19  07:45    


 


Sodium  141 mmol/L (136-145)   19  07:45    


 


Potassium  4.4 mmol/L (3.5-5.1)   19  07:45    


 


Chloride  107 mmol/L ()   19  07:45    


 


Carbon Dioxide  28 mmol/L (21-32)   19  07:45    


 


Anion Gap  6 MMOL/L (8-16)  L  19  07:45    


 


BUN  17 mg/dL (7-18)   19  07:45    


 


Creatinine  0.8 mg/dL (0.55-1.3)   19  07:45    


 


Creat Clearance w eGFR  112.03  (>60)   19  07:45    


 


Random Glucose  92 mg/dL ()   19  07:45    


 


Calcium  8.8 mg/dL (8.5-10.1)   19  07:45    


 


Total Bilirubin  0.4 mg/dL (0.2-1)   19  07:45    


 


AST  22 U/L (15-37)   19  07:45    


 


ALT  38 U/L (13-61)   19  07:45    


 


Alkaline Phosphatase  94 U/L ()   19  07:45    


 


Total Protein  6.6 g/dl (6.4-8.2)   19  07:45    


 


Albumin  3.6 g/dl (3.4-5.0)   19  07:45    


 


RPR Titer  Nonreactive  (NONREACTIVE)   19  07:45    








Labs noted


Assessment: 





19 15:08


Withdrawal sx


Plan: 





Continue detox

## 2019-03-31 RX ADMIN — Medication SCH MG: at 22:30

## 2019-03-31 RX ADMIN — Medication SCH TAB: at 10:21

## 2019-03-31 NOTE — PN
[facility] CIWA





- CIWA Score


Nausea/Vomitin-No Nausea/No Vomiting


Muscle Tremors: 3


Anxiety: 2


Agitation: 3


Paroxysmal Sweats: 2


Orientation: 0-Oriented


Tacttile Disturbances: 0-None


Auditory Disturbances: 0-None


Visual Disturbances: 0-None


Headache: 0-None Present


CIWA-Ar Total Score: 10





[facility] COWS





- Scale


Resting Pulse: 0= [place] 80 or Below


Sweatin= Chills/Flushing


Restless Observation: 0= Sits Still


Pupil Size: 0= Normal to Room Light


Bone or Joint Aches: 2= Severe Diffuse Aches


Runny Nose/ Eye Tearin= Nasal Congestion


GI Upset > 30mins: 0= None


Tremor Observation of Outstretched Hands: 1= Tremor Felt, Not Seen


Yawning Observation: 2= >3x During Session


Anxiety or Irritability: 2=Irritable/Anxious


Goose Flesh Skin: 0=Smooth Skin


COWS Score: 9





[facility] Progress Note (SOAP)


Subjective: 





sweats


mild shakes


interrupted sleep


body aches








Objective: 





19 13:13


 Vital Signs











Temperature  97.0 F L  19 10:02


 


Pulse Rate  70   19 10:02


 


Respiratory Rate  18   19 10:02


 


Blood Pressure  117/76   19 10:02


 


O2 Sat by Pulse Oximetry (%)      








 Laboratory Tests











  19





  07:45 07:45 07:45


 


WBC  6.3  


 


RBC  5.21  


 


Hgb  15.6  


 


Hct  47.9  


 


MCV  91.8  


 


MCH  29.9  


 


MCHC  32.6  


 


RDW  14.0  


 


Plt Count  217  D  


 


MPV  9.2  


 


Sodium   141 


 


Potassium   4.4 


 


Chloride   107 


 


Carbon Dioxide   28 


 


Anion Gap   6 L 


 


BUN   17 


 


Creatinine   0.8 


 


Creat Clearance w eGFR   112.03 


 


Random Glucose   92 


 


Calcium   8.8 


 


Total Bilirubin   0.4 


 


AST   22 


 


ALT   38 


 


Alkaline Phosphatase   94 


 


Total Protein   6.6 


 


Albumin   3.6 


 


RPR Titer    Nonreactive








aaox3


ambulating


no acute distress


Assessment: 





19 13:13


withdrawal sx


Plan: 





continue detox


increase fluids

## 2019-04-01 VITALS — DIASTOLIC BLOOD PRESSURE: 74 MMHG | SYSTOLIC BLOOD PRESSURE: 120 MMHG | HEART RATE: 65 BPM | TEMPERATURE: 97.5 F

## 2019-04-01 RX ADMIN — METHOCARBAMOL PRN MG: 500 TABLET ORAL at 06:10

## 2019-04-01 NOTE — PN
[facility] Progress Note


Note: 





pt states he is feeling much better and want to leave today. Pt was advised 

that he has not completed detox and should stay to complete detox. all 

disciplines involved to advise pt to stay however, pt insisted on leaving and 

signed out AMA.

## 2019-04-01 NOTE — DS
[facility] Detox Discharge Summary


Admission Date: 


03/29/19





Discharge Date: 04/01/19





- History


Present History: Alcohol Dependence, Cannabis Dependence, Opioid Dependence, 

Pcp Dependence





- Physical Exam Results


Vital Signs: 


 Vital Signs











Temperature  97.5 F L  04/01/19 08:55


 


Pulse Rate  65   04/01/19 08:55


 


Respiratory Rate  18   04/01/19 08:55


 


Blood Pressure  120/74   04/01/19 08:55


 


O2 Sat by Pulse Oximetry (%)      














- Treatment


Hospital Course: Detox Protocol Followed, Detoxed Safely, Responded well, 

Discharged Condition Good, Rehab Referral Accepted





- Medication


Discharge Medications: 


Ambulatory Orders





NK [No Known Home Medication]  01/31/19 











- Diagnosis


(1) Alcohol dependence with uncomplicated withdrawal


Current Visit: Yes   Status: Chronic   





(2) Opioid dependence with withdrawal


Current Visit: Yes   Status: Chronic   





(3) Nicotine dependence


Current Visit: Yes   Status: Chronic   


Qualifiers: 


   Nicotine product type: cigarettes   Substance use status: uncomplicated   

Qualified Code(s): F17.210 - Nicotine dependence, cigarettes, uncomplicated   





(4) Phencyclidine dependence


Current Visit: Yes   Status: Chronic   





(5) Cannabis dependence, uncomplicated


Current Visit: Yes   Status: Chronic   





(6) Depressed affect


Current Visit: No   Status: Acute   





(7) Polysubstance abuse


Current Visit: Yes   Status: Chronic   





(8) Substance induced mood disorder


Current Visit: No   Status: Suspected   





(9) Substance induced mood disorder


Current Visit: No   Status: Suspected   





- AMA


Did Patient Leave Against Medical Advice: Yes (going home.)

## 2021-04-10 ENCOUNTER — HOSPITAL ENCOUNTER (INPATIENT)
Dept: HOSPITAL 74 - YASAS | Age: 36
LOS: 5 days | Discharge: OTHER INSTITUTION | DRG: 773 | End: 2021-04-15
Attending: ALLERGY & IMMUNOLOGY | Admitting: ALLERGY & IMMUNOLOGY
Payer: COMMERCIAL

## 2021-04-10 VITALS — BODY MASS INDEX: 38.1 KG/M2

## 2021-04-10 DIAGNOSIS — F12.20: ICD-10-CM

## 2021-04-10 DIAGNOSIS — Z59.0: ICD-10-CM

## 2021-04-10 DIAGNOSIS — Z56.0: ICD-10-CM

## 2021-04-10 DIAGNOSIS — F11.23: ICD-10-CM

## 2021-04-10 DIAGNOSIS — F19.24: ICD-10-CM

## 2021-04-10 DIAGNOSIS — F10.230: Primary | ICD-10-CM

## 2021-04-10 DIAGNOSIS — F16.20: ICD-10-CM

## 2021-04-10 DIAGNOSIS — F17.210: ICD-10-CM

## 2021-04-10 DIAGNOSIS — F41.9: ICD-10-CM

## 2021-04-10 DIAGNOSIS — F51.05: ICD-10-CM

## 2021-04-10 DIAGNOSIS — F32.9: ICD-10-CM

## 2021-04-10 DIAGNOSIS — F14.20: ICD-10-CM

## 2021-04-10 PROCEDURE — HZ2ZZZZ DETOXIFICATION SERVICES FOR SUBSTANCE ABUSE TREATMENT: ICD-10-PCS | Performed by: ALLERGY & IMMUNOLOGY

## 2021-04-10 PROCEDURE — C9803 HOPD COVID-19 SPEC COLLECT: HCPCS

## 2021-04-10 PROCEDURE — U0005 INFEC AGEN DETEC AMPLI PROBE: HCPCS

## 2021-04-10 PROCEDURE — U0003 INFECTIOUS AGENT DETECTION BY NUCLEIC ACID (DNA OR RNA); SEVERE ACUTE RESPIRATORY SYNDROME CORONAVIRUS 2 (SARS-COV-2) (CORONAVIRUS DISEASE [COVID-19]), AMPLIFIED PROBE TECHNIQUE, MAKING USE OF HIGH THROUGHPUT TECHNOLOGIES AS DESCRIBED BY CMS-2020-01-R: HCPCS

## 2021-04-10 RX ADMIN — IBUPROFEN PRN MG: 400 TABLET, FILM COATED ORAL at 22:34

## 2021-04-10 RX ADMIN — HYDROXYZINE PAMOATE SCH MG: 25 CAPSULE ORAL at 22:32

## 2021-04-10 RX ADMIN — NICOTINE SCH: 21 PATCH TRANSDERMAL at 19:06

## 2021-04-10 RX ADMIN — Medication SCH MG: at 22:33

## 2021-04-10 RX ADMIN — METHOCARBAMOL PRN MG: 500 TABLET ORAL at 22:35

## 2021-04-10 RX ADMIN — Medication SCH MG: at 22:32

## 2021-04-10 RX ADMIN — HYDROXYZINE PAMOATE SCH MG: 25 CAPSULE ORAL at 18:01

## 2021-04-10 SDOH — ECONOMIC STABILITY - INCOME SECURITY: UNEMPLOYMENT, UNSPECIFIED: Z56.0

## 2021-04-10 SDOH — ECONOMIC STABILITY - HOUSING INSECURITY: HOMELESSNESS: Z59.0

## 2021-04-11 LAB
ALBUMIN SERPL-MCNC: 3.5 G/DL (ref 3.4–5)
ALP SERPL-CCNC: 123 U/L (ref 45–117)
ALT SERPL-CCNC: 44 U/L (ref 13–61)
ANION GAP SERPL CALC-SCNC: 2 MMOL/L (ref 8–16)
AST SERPL-CCNC: 22 U/L (ref 15–37)
BILIRUB SERPL-MCNC: 0.4 MG/DL (ref 0.2–1)
BUN SERPL-MCNC: 18.7 MG/DL (ref 7–18)
CALCIUM SERPL-MCNC: 9.5 MG/DL (ref 8.5–10.1)
CHLORIDE SERPL-SCNC: 103 MMOL/L (ref 98–107)
CO2 SERPL-SCNC: 32 MMOL/L (ref 21–32)
CREAT SERPL-MCNC: 1 MG/DL (ref 0.55–1.3)
DEPRECATED RDW RBC AUTO: 13.5 % (ref 11.9–15.9)
GLUCOSE SERPL-MCNC: 85 MG/DL (ref 74–106)
HCT VFR BLD CALC: 45.6 % (ref 35.4–49)
HGB BLD-MCNC: 15.3 GM/DL (ref 11.7–16.9)
HIV 1+2 AB+HIV1 P24 AG SERPL QL IA: NEGATIVE
MCH RBC QN AUTO: 30.6 PG (ref 25.7–33.7)
MCHC RBC AUTO-ENTMCNC: 33.5 G/DL (ref 32–35.9)
MCV RBC: 91.3 FL (ref 80–96)
PLATELET # BLD AUTO: 224 K/MM3 (ref 134–434)
PMV BLD: 9.7 FL (ref 7.5–11.1)
PROT SERPL-MCNC: 6.3 G/DL (ref 6.4–8.2)
RBC # BLD AUTO: 5 M/MM3 (ref 4–5.6)
SODIUM SERPL-SCNC: 138 MMOL/L (ref 136–145)
WBC # BLD AUTO: 7 K/MM3 (ref 4–10)

## 2021-04-11 RX ADMIN — HYDROXYZINE PAMOATE SCH MG: 25 CAPSULE ORAL at 22:42

## 2021-04-11 RX ADMIN — Medication SCH MG: at 22:42

## 2021-04-11 RX ADMIN — HYDROXYZINE PAMOATE SCH MG: 25 CAPSULE ORAL at 10:30

## 2021-04-11 RX ADMIN — HYDROXYZINE PAMOATE SCH MG: 25 CAPSULE ORAL at 19:11

## 2021-04-11 RX ADMIN — HYDROXYZINE PAMOATE SCH: 25 CAPSULE ORAL at 15:10

## 2021-04-11 RX ADMIN — NICOTINE SCH MG: 21 PATCH TRANSDERMAL at 10:28

## 2021-04-11 RX ADMIN — HYDROXYZINE PAMOATE SCH MG: 25 CAPSULE ORAL at 06:36

## 2021-04-11 RX ADMIN — Medication SCH TAB: at 10:28

## 2021-04-12 RX ADMIN — HYDROXYZINE PAMOATE SCH MG: 25 CAPSULE ORAL at 17:57

## 2021-04-12 RX ADMIN — HYDROXYZINE PAMOATE SCH MG: 25 CAPSULE ORAL at 22:25

## 2021-04-12 RX ADMIN — NICOTINE SCH MG: 21 PATCH TRANSDERMAL at 10:27

## 2021-04-12 RX ADMIN — Medication SCH MG: at 22:25

## 2021-04-12 RX ADMIN — HYDROXYZINE PAMOATE SCH MG: 25 CAPSULE ORAL at 10:27

## 2021-04-12 RX ADMIN — Medication SCH TAB: at 10:27

## 2021-04-12 RX ADMIN — HYDROXYZINE PAMOATE SCH MG: 25 CAPSULE ORAL at 06:29

## 2021-04-12 RX ADMIN — HYDROXYZINE PAMOATE SCH: 25 CAPSULE ORAL at 15:09

## 2021-04-13 RX ADMIN — HYDROXYZINE PAMOATE SCH MG: 25 CAPSULE ORAL at 05:43

## 2021-04-13 RX ADMIN — HYDROXYZINE PAMOATE PRN MG: 25 CAPSULE ORAL at 22:31

## 2021-04-13 RX ADMIN — Medication SCH MG: at 22:30

## 2021-04-13 RX ADMIN — HYDROXYZINE PAMOATE PRN MG: 25 CAPSULE ORAL at 18:20

## 2021-04-13 RX ADMIN — NICOTINE SCH: 21 PATCH TRANSDERMAL at 10:30

## 2021-04-13 RX ADMIN — HYDROXYZINE PAMOATE SCH MG: 25 CAPSULE ORAL at 10:30

## 2021-04-13 RX ADMIN — Medication SCH TAB: at 10:29

## 2021-04-14 RX ADMIN — METHOCARBAMOL PRN MG: 500 TABLET ORAL at 22:46

## 2021-04-14 RX ADMIN — HYDROXYZINE PAMOATE PRN MG: 25 CAPSULE ORAL at 10:18

## 2021-04-14 RX ADMIN — IBUPROFEN PRN MG: 400 TABLET, FILM COATED ORAL at 22:46

## 2021-04-14 RX ADMIN — Medication SCH MG: at 22:44

## 2021-04-14 RX ADMIN — NICOTINE SCH: 21 PATCH TRANSDERMAL at 10:19

## 2021-04-14 RX ADMIN — HYDROXYZINE PAMOATE PRN MG: 25 CAPSULE ORAL at 18:14

## 2021-04-14 RX ADMIN — Medication SCH TAB: at 10:18

## 2021-04-15 VITALS — DIASTOLIC BLOOD PRESSURE: 76 MMHG | TEMPERATURE: 97.1 F | HEART RATE: 77 BPM | SYSTOLIC BLOOD PRESSURE: 102 MMHG

## 2021-04-15 RX ADMIN — Medication SCH: at 10:26

## 2021-04-15 RX ADMIN — NICOTINE SCH: 21 PATCH TRANSDERMAL at 10:26

## 2022-08-23 ENCOUNTER — HOSPITAL ENCOUNTER (INPATIENT)
Dept: HOSPITAL 74 - YASAS | Age: 37
LOS: 2 days | Discharge: LEFT BEFORE DISCHARGE | DRG: 770 | End: 2022-08-25
Attending: SURGERY | Admitting: ALLERGY & IMMUNOLOGY
Payer: COMMERCIAL

## 2022-08-23 VITALS — BODY MASS INDEX: 34.8 KG/M2

## 2022-08-23 VITALS — RESPIRATION RATE: 18 BRPM

## 2022-08-23 DIAGNOSIS — F11.23: Primary | ICD-10-CM

## 2022-08-23 DIAGNOSIS — F14.20: ICD-10-CM

## 2022-08-23 DIAGNOSIS — F10.20: ICD-10-CM

## 2022-08-23 DIAGNOSIS — F32.A: ICD-10-CM

## 2022-08-23 DIAGNOSIS — F51.05: ICD-10-CM

## 2022-08-23 DIAGNOSIS — F41.9: ICD-10-CM

## 2022-08-23 DIAGNOSIS — F17.210: ICD-10-CM

## 2022-08-23 DIAGNOSIS — F16.20: ICD-10-CM

## 2022-08-23 DIAGNOSIS — Z59.00: ICD-10-CM

## 2022-08-23 PROCEDURE — HZ2ZZZZ DETOXIFICATION SERVICES FOR SUBSTANCE ABUSE TREATMENT: ICD-10-PCS | Performed by: SURGERY

## 2022-08-23 PROCEDURE — U0003 INFECTIOUS AGENT DETECTION BY NUCLEIC ACID (DNA OR RNA); SEVERE ACUTE RESPIRATORY SYNDROME CORONAVIRUS 2 (SARS-COV-2) (CORONAVIRUS DISEASE [COVID-19]), AMPLIFIED PROBE TECHNIQUE, MAKING USE OF HIGH THROUGHPUT TECHNOLOGIES AS DESCRIBED BY CMS-2020-01-R: HCPCS

## 2022-08-23 PROCEDURE — U0005 INFEC AGEN DETEC AMPLI PROBE: HCPCS

## 2022-08-23 SDOH — ECONOMIC STABILITY - HOUSING INSECURITY: HOMELESSNESS UNSPECIFIED: Z59.00

## 2022-08-24 LAB
ALBUMIN SERPL-MCNC: 3.1 G/DL (ref 3.4–5)
ALP SERPL-CCNC: 98 U/L (ref 45–117)
ALT SERPL-CCNC: 41 U/L (ref 13–61)
ANION GAP SERPL CALC-SCNC: 3 MMOL/L (ref 8–16)
AST SERPL-CCNC: 31 U/L (ref 15–37)
BILIRUB SERPL-MCNC: 0.3 MG/DL (ref 0.2–1)
BUN SERPL-MCNC: 16.5 MG/DL (ref 7–18)
CALCIUM SERPL-MCNC: 8.8 MG/DL (ref 8.5–10.1)
CHLORIDE SERPL-SCNC: 106 MMOL/L (ref 98–107)
CO2 SERPL-SCNC: 30 MMOL/L (ref 21–32)
CREAT SERPL-MCNC: 1 MG/DL (ref 0.55–1.3)
DEPRECATED RDW RBC AUTO: 13.9 % (ref 11.9–15.9)
GLUCOSE SERPL-MCNC: 107 MG/DL (ref 74–106)
HCT VFR BLD CALC: 43.5 % (ref 35.4–49)
HGB BLD-MCNC: 14.5 GM/DL (ref 11.7–16.9)
MCH RBC QN AUTO: 29.6 PG (ref 25.7–33.7)
MCHC RBC AUTO-ENTMCNC: 33.3 G/DL (ref 32–35.9)
MCV RBC: 89.1 FL (ref 80–96)
PLATELET # BLD AUTO: 226 10^3/UL (ref 134–434)
PMV BLD: 9 FL (ref 7.5–11.1)
PROT SERPL-MCNC: 5.7 G/DL (ref 6.4–8.2)
RBC # BLD AUTO: 4.88 M/MM3 (ref 4–5.6)
SODIUM SERPL-SCNC: 140 MMOL/L (ref 136–145)
WBC # BLD AUTO: 7.2 K/MM3 (ref 4–10)

## 2022-08-24 RX ADMIN — NICOTINE SCH: 21 PATCH TRANSDERMAL at 10:25

## 2022-08-24 RX ADMIN — Medication SCH MG: at 23:24

## 2022-08-24 RX ADMIN — Medication SCH: at 00:18

## 2022-08-24 RX ADMIN — METHOCARBAMOL PRN MG: 500 TABLET ORAL at 06:48

## 2022-08-24 RX ADMIN — Medication SCH TAB: at 09:31

## 2022-08-25 VITALS — TEMPERATURE: 96.9 F | SYSTOLIC BLOOD PRESSURE: 111 MMHG | DIASTOLIC BLOOD PRESSURE: 75 MMHG | HEART RATE: 71 BPM

## 2022-08-25 RX ADMIN — Medication SCH TAB: at 10:40

## 2022-08-25 RX ADMIN — METHOCARBAMOL PRN MG: 500 TABLET ORAL at 10:40

## 2022-08-25 RX ADMIN — NICOTINE SCH: 21 PATCH TRANSDERMAL at 10:39
